# Patient Record
Sex: FEMALE | Race: WHITE | NOT HISPANIC OR LATINO | Employment: PART TIME | URBAN - NONMETROPOLITAN AREA
[De-identification: names, ages, dates, MRNs, and addresses within clinical notes are randomized per-mention and may not be internally consistent; named-entity substitution may affect disease eponyms.]

---

## 2017-01-04 ENCOUNTER — OFFICE VISIT (OUTPATIENT)
Dept: ORTHOPEDIC SURGERY | Facility: CLINIC | Age: 32
End: 2017-01-04

## 2017-01-04 VITALS — BODY MASS INDEX: 31.1 KG/M2 | WEIGHT: 169 LBS | RESPIRATION RATE: 18 BRPM | HEIGHT: 62 IN

## 2017-01-04 DIAGNOSIS — M94.20 CHONDROMALACIA: Primary | ICD-10-CM

## 2017-01-04 PROCEDURE — 99213 OFFICE O/P EST LOW 20 MIN: CPT | Performed by: ORTHOPAEDIC SURGERY

## 2017-01-04 RX ORDER — ESCITALOPRAM OXALATE 20 MG/1
TABLET ORAL
Refills: 0 | COMMUNITY
Start: 2016-12-30

## 2017-01-04 NOTE — PROGRESS NOTES
"Subjective   Patient ID: Isela Puente is a 31 y.o. female is here today for follow-up for   Follow-up of the Right Knee   much better no swelling no mechanical symptoms progressed to elliptical  in therapy and she is ready to return to work on Monday      History of Present Illness    Review of Systems   Constitutional: Negative for diaphoresis, fever and unexpected weight change.   HENT: Negative for dental problem and sore throat.    Eyes: Negative for visual disturbance.   Respiratory: Negative for shortness of breath.    Cardiovascular: Negative for chest pain.   Gastrointestinal: Negative for abdominal pain, constipation, diarrhea, nausea and vomiting.   Genitourinary: Negative for difficulty urinating and frequency.   Neurological: Negative for headaches.   Hematological: Does not bruise/bleed easily.   All other systems reviewed and are negative.      Past Medical History   Diagnosis Date   • Anxiety    • Knee pain, right         Past Surgical History   Procedure Laterality Date   • Tonsilectomy, adenoidectomy, bilateral myringotomy and tubes     • Dilatation and curettage     • Cholecystectomy     • Mouth surgery         Allergies   Allergen Reactions   • Keflex [Cephalexin] Itching and Rash       Objective   Visit Vitals   • Resp 18   • Ht 62\" (157.5 cm)   • Wt 169 lb (76.7 kg)   • BMI 30.91 kg/m2      Physical Exam   Constitutional: She is oriented to person, place, and time. She appears well-developed and well-nourished.   HENT:   Head: Normocephalic.   Neurological: She is alert and oriented to person, place, and time.   Psychiatric: She has a normal mood and affect.     Constitutional: He is oriented to person, place, and time. He appears well-developed and well-nourished.   HENT:   Head: Normocephalic and atraumatic.   Eyes: EOM are normal. Pupils are equal, round, and reactive to light.   Neck: Normal range of motion. Neck supple.   Cardiovascular: Normal rate.    Pulmonary/Chest: " Effort normal and breath sounds normal.   Abdominal: Soft.   Neurological: He is alert and oriented to person, place, and time.   Skin: Skin is warm and dry.   Psychiatric: He has a normal mood and affect.   Nursing note and vitals reviewed.       Ortho Exam     Skin appears without erythema, normal standing mechanical alignment, full range of motion, negative signs of instability with negative Lachman and posterior drawer instability signs, negative medial and lateral collateral instability, Tanya sign negative, extensor mechanism nontender with good strength, negative patellofemoral crepitus, calf supple, no associated ankle edema, intact neurovascular status to the lower leg, no knee pain elicited with ipsilateral hip range of motion.  Assessment/Plan   Review of Radiographic Studies:    No new imaging done today.      Procedures     There are no diagnoses linked to this encounter.   Work status form completed and provided to patient      Recommendations/Plan:   Work/Activity Status: May perform usual activities as tolerated  Return to work date January 9, 2017  No Follow-up on file.  Patient agreeable to call or return sooner for any concerns.             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

## 2017-01-04 NOTE — LETTER
January 4, 2017     Patient: Isela Puente   YOB: 1985   Date of Visit: 1/4/2017       To Whom It May Concern:    It is my medical opinion that Isela Puente may return to work without restrictions on on 1/9/16.           Sincerely,        Andre Ribera MD    CC: No Recipients

## 2017-01-04 NOTE — MR AVS SNAPSHOT
Isela Myrick Cindi   2017 8:30 AM   Office Visit    Dept Phone:  [phone]   Encounter #:  [id number]    Provider:  Andre Ribera MD   Department:  [facility]                Your Full Care Plan              Your Updated Medication List          This list is accurate as of: 17  8:50 AM.  Always use your most recent med list.                diclofenac 75 MG EC tablet   Commonly known as:  VOLTAREN   Take 1 tablet by mouth 2 (Two) Times a Day.       * escitalopram 10 MG tablet   Commonly known as:  LEXAPRO       * escitalopram 20 MG tablet   Commonly known as:  LEXAPRO       * Notice:  This list has 2 medication(s) that are the same as other medications prescribed for you. Read the directions carefully, and ask your doctor or other care provider to review them with you.            Instructions     None    Patient Instructions History      Upcoming Appointments     Visit Type Date Time Department    OFFICE VISIT 2017  8:30 AM [facility]      [employer] Signup     [facility] [employer] allows you to send messages to your doctor, view your test results, renew your prescriptions, schedule appointments, and more. To sign up, go to [employer] and click on the Sign Up Now link in the New User? box. Enter your [employer] Activation Code exactly as it appears below along with the last four digits of your Social Security Number and your Date of Birth () to complete the sign-up process. If you do not sign up before the expiration date, you must request a new code.    [employer] Activation Code: [email or web address]  Expires: 2017  8:50 AM    If you have questions, you can email [email or web address] or call [phone] to talk to our [employer] staff. Remember, [employer] is NOT to be used for urgent needs. For medical emergencies, dial 911.               Other Info from Your Visit           Allergies     Keflex  "[Cephalexin]  Itching, Rash      Reason for Visit     Right Knee - Follow-up           Vital Signs     Respirations Height Weight Body Mass Index Smoking Status       18 62\" (157.5 cm) 169 lb (76.7 kg) 30.91 kg/m2 Current Every Day Smoker         "